# Patient Record
Sex: MALE | Race: WHITE | NOT HISPANIC OR LATINO | Employment: PART TIME | URBAN - METROPOLITAN AREA
[De-identification: names, ages, dates, MRNs, and addresses within clinical notes are randomized per-mention and may not be internally consistent; named-entity substitution may affect disease eponyms.]

---

## 2021-05-26 ENCOUNTER — RECORDS - HEALTHEAST (OUTPATIENT)
Dept: ADMINISTRATIVE | Facility: CLINIC | Age: 61
End: 2021-05-26

## 2023-11-13 ENCOUNTER — TRANSFERRED RECORDS (OUTPATIENT)
Dept: HEALTH INFORMATION MANAGEMENT | Facility: CLINIC | Age: 63
End: 2023-11-13
Payer: COMMERCIAL

## 2023-11-16 ENCOUNTER — TRANSFERRED RECORDS (OUTPATIENT)
Dept: HEALTH INFORMATION MANAGEMENT | Facility: CLINIC | Age: 63
End: 2023-11-16
Payer: COMMERCIAL

## 2023-11-16 ENCOUNTER — MEDICAL CORRESPONDENCE (OUTPATIENT)
Dept: HEALTH INFORMATION MANAGEMENT | Facility: CLINIC | Age: 63
End: 2023-11-16
Payer: COMMERCIAL

## 2023-11-21 ENCOUNTER — TRANSCRIBE ORDERS (OUTPATIENT)
Dept: OTHER | Age: 63
End: 2023-11-21

## 2023-11-21 DIAGNOSIS — R41.89 BRAIN FOG: ICD-10-CM

## 2023-11-21 DIAGNOSIS — H93.A3 PULSATILE TINNITUS, BILATERAL: ICD-10-CM

## 2023-11-21 DIAGNOSIS — R51.9 HEADACHE: ICD-10-CM

## 2023-11-21 DIAGNOSIS — H93.19 TINNITUS: Primary | ICD-10-CM

## 2024-04-15 PROBLEM — H90.12 CONDUCTIVE HEARING LOSS OF LEFT EAR WITH UNRESTRICTED HEARING OF RIGHT EAR: Status: ACTIVE | Noted: 2024-04-15

## 2024-04-15 PROBLEM — H93.19 TINNITUS: Status: ACTIVE | Noted: 2024-04-15

## 2024-04-15 PROBLEM — N18.9 CKD (CHRONIC KIDNEY DISEASE): Status: ACTIVE | Noted: 2024-04-15

## 2024-04-15 PROBLEM — G47.33 OBSTRUCTIVE SLEEP APNEA SYNDROME: Status: ACTIVE | Noted: 2017-09-29

## 2024-04-15 PROBLEM — E78.5 HYPERLIPIDEMIA: Status: ACTIVE | Noted: 2021-11-23

## 2024-04-15 NOTE — PROGRESS NOTES
NEUROLOGY CONSULTATION NOTE       [facility] NEUROLOGY [place]  [address] [place] [address]  Tel: [phone]  Fax: [phone]  [email or web address]     Dallas Doran,  1960, MRN [id number]  PCP: Esau Timmons  Date: 2024     ASSESSMENT & PLAN     Visit Diagnosis  Chronic tension-type headache, intractable  Benign intracranial hypertension  Pulsatile tinnitus, bilateral     Chronic tension-type headache; R/O benign intracranial hypertension  63-year-old male with osteoarthritis with multiple surgeries including L4-L5, L5-S1 laminectomy, GWENDOLYN on CPAP, hearing loss, HLD, CKD who was referred for evaluation of progressively worsening headache along with pulsatile tinnitus.  Clinically his exam is normal except for absent venous pulsation on funduscopic exam.  He had extensive workup in the past including CT temporal bone, MRI brain, MRV that were normal.  In the past he was tried on nortriptyline but he never got the prescriptions filled.  I do suspect we are dealing with chronic tension-type headache and his tinnitus is due to hearing loss but with absent venous pulsations on funduscopic exam I have recommended lumbar puncture to rule out benign intracranial hypertension.  I have recommended:    1.  Lumbar puncture under fluoroscopy to check opening pressure.  Send CSF for routine studies  2.  If opening pressure is normal I would recommend starting him on Effexor although I doubt he will take that medicine as he is against taking any psych medication  3.  Follow-up after above tests    Thank you again for this referral, please feel free to contact me if you have any questions.    Justice Cervantes MD  [facility]     REASON FOR CONSULTATION Migraine        HISTORY OF PRESENT ILLNESS     We have been requested by Dr. Timmons to evaluate Dallas Doran who is a 63 year old  male for tinnitus & headache     Patient is a 63-year-old male with  history of osteoarthritis with multiple surgeries including L4-L5, L5-S1 laminectomy, GWENDOLYN on CPAP, hearing loss, HLD and CKD who was referred for evaluation of headache along with tinnitus.  His symptoms started 4 years ago and progressively have gotten worse.  He describes a pressure-like sensation in the top of the head and usually wakes up with a headache but it persist throughout the day.  It is worse in the morning as compared to nighttime.  He was evaluated by ENT and a eustachian tube dysfunction was suspected..  He had an MRI of the brain with IAC that was unremarkable.  He was started on nortriptyline and also had MRI and MRV repeated that was unremarkable.  However he admits he never took that medication.  Patient's headaches are mostly in the top of his head.  This is not associated with nausea vomiting photophobia or phonophobia.  He does feel pulsatile tinnitus and occasionally has blurred vision.     PROBLEM LIST   Patient Active Problem List   Diagnosis    CKD (chronic kidney disease)    Conductive hearing loss of left ear with unrestricted hearing of right ear    Hyperlipidemia    Obstructive sleep apnea syndrome    Tinnitus         PAST MEDICAL & SURGICAL HISTORY     Past Medical History:   Patient  has no past medical history on file.    Surgical History:  He  has a past surgical history that includes Lumbar Laminectomy; Replacement Total Knee; and Shoulder Arthroscopy W/ Rotator Cuff Repair.     SOCIAL HISTORY     Reviewed, and he  reports that he has never smoked. He has never used smokeless tobacco. He reports current alcohol use of about 3.0 standard drinks of alcohol per week.     FAMILY HISTORY     Reviewed, and family history includes Cerebrovascular Disease in his mother; Hyperlipidemia in his father; Hypertension in his father; Myocardial Infarction in his father.     ALLERGIES     Allergies   Allergen Reactions    Pollen Extract Other (See Comments)     Pt reports runny nose, itchy  "eyes/mouth         REVIEW OF SYSTEMS     A 12 point review of system was performed and was negative except as outlined in the history of present illness.     HOME MEDICATIONS     Current Outpatient Rx   Medication Sig Dispense Refill    aspirin 81 MG EC tablet Take 81 mg by mouth daily      fluticasone (FLONASE) 50 MCG/ACT nasal spray Spray 1 spray into both nostrils daily           PHYSICAL EXAM     Vital signs  /88 (BP Location: Right arm, Patient Position: Sitting)   Pulse 62   Ht 1.854 m (6' 1\")   Wt 118.8 kg (262 lb)   BMI 34.57 kg/m      Weight:   262 lbs 0 oz    Patient is alert and oriented x4 in no acute distress. Vital signs were reviewed and are documented in electronic medical record. Neck was supple, no carotid bruits, thyromegaly, JVD, or lymphadenopathy was noted.   NEUROLOGY EXAM:   Patient s speech was normal with no aphasia or dysarthria. Mentation, and affect were also normal.    Funduscopic exam was normal, venous pulsations absent. Cranial nerves II -XII were intact.    Patient had normal mass, tone and motor strength was 5/5 in all extremities without pronator drift.    Sensation was intact to light touch, pinprick, and vibratory sensation.    Reflexes were 1+ symmetrical with downgoing toes.    No dysmetria noted on FNF or HKS. Romberg was negative.   Gait testing was normal. Able to walk on toes/heels. Tandem walk normal.     PERTINENT DIAGNOSTIC STUDIES     Following studies were reviewed:     CT TEMPORAL BONE 2/28/2024  No findings in the bilateral temporal bones to explain patient's symptomatology.    MRI BRAIN, MRV 2/29/2024  No structural abnormality to suggest intracranial hypotension.     PERTINENT LABS  Following labs were reviewed:  No visits with results within 3 Month(s) from this visit.   Latest known visit with results is:   No results found for any previous visit.        Total time spent for face to face visit, reviewing labs/imaging studies, counseling and " coordination of care was: 1 Hour spent on the date of the encounter doing chart review, review of outside records, review of test results, interpretation of tests, patient visit, and documentation     This note was dictated using voice recognition software.  Any grammatical or context distortions are unintentional and inherent to the software.    Orders Placed This Encounter   Procedures    XR Lumbar Puncture Spinal Tap Diagnostic    Protein total CSF:    Glucose CSF:    Cerebrospinal fluid Aerobic Bacterial Culture Routine With Gram Stain    Gram stain    CSF Cell Count with Differential:    CSF Cell Count with Differential:      New Prescriptions    No medications on file      Modified Medications    No medications on file

## 2024-04-17 ENCOUNTER — OFFICE VISIT (OUTPATIENT)
Dept: NEUROLOGY | Facility: CLINIC | Age: 64
End: 2024-04-17
Attending: FAMILY MEDICINE
Payer: COMMERCIAL

## 2024-04-17 VITALS
HEART RATE: 62 BPM | HEIGHT: 73 IN | BODY MASS INDEX: 34.72 KG/M2 | WEIGHT: 262 LBS | SYSTOLIC BLOOD PRESSURE: 135 MMHG | DIASTOLIC BLOOD PRESSURE: 88 MMHG

## 2024-04-17 DIAGNOSIS — H93.A3 PULSATILE TINNITUS, BILATERAL: ICD-10-CM

## 2024-04-17 DIAGNOSIS — G44.221 CHRONIC TENSION-TYPE HEADACHE, INTRACTABLE: Primary | ICD-10-CM

## 2024-04-17 DIAGNOSIS — G93.2 BENIGN INTRACRANIAL HYPERTENSION: ICD-10-CM

## 2024-04-17 PROCEDURE — G2211 COMPLEX E/M VISIT ADD ON: HCPCS | Performed by: PSYCHIATRY & NEUROLOGY

## 2024-04-17 PROCEDURE — 99205 OFFICE O/P NEW HI 60 MIN: CPT | Performed by: PSYCHIATRY & NEUROLOGY

## 2024-04-17 RX ORDER — FLUTICASONE PROPIONATE 50 MCG
1 SPRAY, SUSPENSION (ML) NASAL DAILY
COMMUNITY

## 2024-04-17 RX ORDER — ASPIRIN 81 MG/1
81 TABLET ORAL DAILY
COMMUNITY

## 2024-04-17 NOTE — NURSING NOTE
Chief Complaint   Patient presents with    Migraine     Patient reports tinnitus and headaches that are worsening      Lashawn Bill CMA on 4/17/2024 at 9:46 AM  [facility]